# Patient Record
Sex: MALE | Race: WHITE | NOT HISPANIC OR LATINO | Employment: OTHER | URBAN - METROPOLITAN AREA
[De-identification: names, ages, dates, MRNs, and addresses within clinical notes are randomized per-mention and may not be internally consistent; named-entity substitution may affect disease eponyms.]

---

## 2017-01-06 DIAGNOSIS — J44.1 COPD EXACERBATION: ICD-10-CM

## 2017-01-06 RX ORDER — PREDNISONE 20 MG/1
TABLET ORAL
Qty: 50 TABLET | Refills: 1 | Status: SHIPPED | OUTPATIENT
Start: 2017-01-06 | End: 2017-05-25 | Stop reason: SDUPTHER

## 2017-05-05 DIAGNOSIS — G47.00 INSOMNIA, UNSPECIFIED TYPE: Primary | ICD-10-CM

## 2017-05-05 RX ORDER — TEMAZEPAM 30 MG/1
30 CAPSULE ORAL NIGHTLY PRN
Qty: 30 CAPSULE | Refills: 5 | Status: SHIPPED | OUTPATIENT
Start: 2017-05-05 | End: 2017-06-04

## 2017-05-05 RX ORDER — TEMAZEPAM 30 MG/1
CAPSULE ORAL
Qty: 30 CAPSULE | Refills: 5 | OUTPATIENT
Start: 2017-05-05

## 2017-05-25 ENCOUNTER — OFFICE VISIT (OUTPATIENT)
Dept: PULMONOLOGY | Facility: CLINIC | Age: 72
End: 2017-05-25
Payer: MEDICARE

## 2017-05-25 ENCOUNTER — PROCEDURE VISIT (OUTPATIENT)
Dept: PULMONOLOGY | Facility: CLINIC | Age: 72
End: 2017-05-25
Payer: MEDICARE

## 2017-05-25 VITALS
OXYGEN SATURATION: 93 % | WEIGHT: 202.81 LBS | HEART RATE: 62 BPM | HEIGHT: 73 IN | DIASTOLIC BLOOD PRESSURE: 64 MMHG | BODY MASS INDEX: 26.88 KG/M2 | SYSTOLIC BLOOD PRESSURE: 118 MMHG

## 2017-05-25 DIAGNOSIS — F17.200 SMOKING: Primary | ICD-10-CM

## 2017-05-25 DIAGNOSIS — J44.9 COPD (CHRONIC OBSTRUCTIVE PULMONARY DISEASE): Primary | ICD-10-CM

## 2017-05-25 DIAGNOSIS — J44.9 CHRONIC OBSTRUCTIVE PULMONARY DISEASE, UNSPECIFIED COPD TYPE: ICD-10-CM

## 2017-05-25 DIAGNOSIS — J44.1 COPD EXACERBATION: ICD-10-CM

## 2017-05-25 LAB
PRE FEV1 FVC: 52.05 % (ref 63.31–82.66)
PRE FEV1: 1.25 L (ref 2.68–4.39)
PRE FVC: 2.4 L (ref 3.82–5.84)
PRE PEF: 3.28 L/S (ref 6.22–11.26)

## 2017-05-25 PROCEDURE — 99999 PR PBB SHADOW E&M-EST. PATIENT-LVL IV: CPT | Mod: PBBFAC,,, | Performed by: INTERNAL MEDICINE

## 2017-05-25 PROCEDURE — 99215 OFFICE O/P EST HI 40 MIN: CPT | Mod: 25,S$PBB,, | Performed by: INTERNAL MEDICINE

## 2017-05-25 PROCEDURE — 94010 BREATHING CAPACITY TEST: CPT | Mod: 26,S$PBB,, | Performed by: INTERNAL MEDICINE

## 2017-05-25 PROCEDURE — 99214 OFFICE O/P EST MOD 30 MIN: CPT | Mod: PBBFAC,25,PO | Performed by: INTERNAL MEDICINE

## 2017-05-25 RX ORDER — CHOLECALCIFEROL (VITAMIN D3) 25 MCG
1000 TABLET ORAL DAILY
COMMUNITY

## 2017-05-25 RX ORDER — PREDNISONE 20 MG/1
TABLET ORAL
Qty: 50 TABLET | Refills: 1 | Status: SHIPPED | OUTPATIENT
Start: 2017-05-25 | End: 2017-12-12 | Stop reason: ALTCHOICE

## 2017-05-25 RX ORDER — ALBUTEROL SULFATE 90 UG/1
2 AEROSOL, METERED RESPIRATORY (INHALATION) EVERY 6 HOURS
Qty: 8.5 G | Refills: 11 | Status: SHIPPED | OUTPATIENT
Start: 2017-05-25

## 2017-05-25 RX ORDER — ROSUVASTATIN CALCIUM 40 MG/1
TABLET, COATED ORAL
Refills: 6 | COMMUNITY
Start: 2017-04-25

## 2017-05-25 RX ORDER — AZITHROMYCIN 250 MG/1
250 TABLET, FILM COATED ORAL DAILY
Qty: 6 TABLET | Refills: 1 | Status: SHIPPED | OUTPATIENT
Start: 2017-05-25 | End: 2017-05-30 | Stop reason: SDUPTHER

## 2017-05-25 RX ORDER — PREDNISONE 20 MG/1
TABLET ORAL
Qty: 20 TABLET | Refills: 1 | Status: SHIPPED | OUTPATIENT
Start: 2017-05-25 | End: 2017-07-14 | Stop reason: SDUPTHER

## 2017-05-25 NOTE — PROGRESS NOTES
Subjective:       Patient ID: Harpreet Reeves is a 71 y.o. male.    Chief Complaint:   He   presents for evaluation and treatment of pneumonia and exacerbation of chronic obstructive pulmonary disease  after being discharged from the [facility]  5  months ago. Since discharge symptoms have gradually improving course since that time. Patient denies feer or chills. Symptoms are aggravated by acivity . Symptoms improve with rest.  Respiratory: positive for cough, dyspnea on exertion and sputum; Cardiovascular: no chest pain or palpitations.  Patient currently is on oxygen at 2 L/min per nasal cannula..    COPD    COPD  He presents for evaluation and treatment of COPD. The patient is currently having symptoms / an exacerbation. Current symptoms include acute dyspnea, cough productive of gray sputum in small amounts and wheezing. Symptoms have been present since several days ago and have been gradually worsening. He denies chills and fever. Associated symptoms include poor exercise tolerance and shortness of breath.  This episode appears to have been triggered by no identifiable factor. Treatments tried for the current exacerbation: albuterol nebulizer. The patient has been having similar episodes for approximately 10 years. He uses 1 pillows at night. Patient currently is on oxygen at 2 L/min per nasal cannula. on night. The patient is having no constitutional symptoms, denying fever, chills, anorexia, or weight loss. The patient has been hospitalized for this condition before. He currently smokes 1 packs per day. The patient is experiencing exercise intolerance (difficulty walking 1 blocks on flat ground).      HPI  Past Medical History:   Diagnosis Date    Arthritis     Bronchitis chronic     Coronary artery disease     Hypertension     Lung disease      Past Surgical History:   Procedure Laterality Date    APPENDECTOMY      BACK SURGERY      CARDIAC CATHETERIZATION      CHOLECYSTECTOMY       CORONARY ARTERY BYPASS GRAFT      CYST REMOVAL      tailbone    HIP ARTHROPLASTY       Social History     Social History    Marital status:      Spouse name: N/A    Number of children: N/A    Years of education: N/A     Occupational History    Not on file.     Social History Main Topics    Smoking status: Current Every Day Smoker     Packs/day: 0.50     Years: 58.00     Types: Cigarettes    Smokeless tobacco: Never Used      Comment: quit then started back    Alcohol use 12.6 oz/week     21 Standard drinks or equivalent per week      Comment: whiskey    Drug use: No    Sexual activity: Not on file     Other Topics Concern    Not on file     Social History Narrative    No narrative on file     Review of Systems   Constitutional: Positive for fatigue. Negative for fever.   HENT: Positive for postnasal drip, rhinorrhea and congestion.    Eyes: Negative for redness and itching.   Respiratory: Positive for cough, sputum production, shortness of breath, dyspnea on extertion, use of rescue inhaler and Paroxysmal Nocturnal Dyspnea.    Cardiovascular: Negative for chest pain, palpitations and leg swelling.   Genitourinary: Negative for difficulty urinating and hematuria.   Endocrine: Negative for cold intolerance and heat intolerance.    Skin: Negative for rash.   Gastrointestinal: Negative for nausea and abdominal pain.   Neurological: Negative for dizziness, syncope, weakness and light-headedness.   Hematological: Negative for adenopathy. Does not bruise/bleed easily.   Psychiatric/Behavioral: Negative for sleep disturbance. The patient is not nervous/anxious.        Objective:      Physical Exam   Constitutional: He is oriented to person, place, and time. He appears well-developed and well-nourished.   HENT:   Head: Normocephalic and atraumatic.   Mouth/Throat: Oropharyngeal exudate present.   Eyes: Conjunctivae are normal. Pupils are equal, round, and reactive to light.   Neck: Neck supple. No JVD  present. No tracheal deviation present. No thyromegaly present.   Cardiovascular: Normal rate, regular rhythm and normal heart sounds.    No murmur heard.  Pulmonary/Chest: Effort normal. He has decreased breath sounds. He has wheezes in the right lower field and the left lower field. He has no rhonchi. He has no rales.   Abdominal: Soft. Bowel sounds are normal.   Musculoskeletal: Normal range of motion. He exhibits no edema or tenderness.   Lymphadenopathy:     He has no cervical adenopathy.   Neurological: He is alert and oriented to person, place, and time.   Skin: Skin is warm and dry.   Nursing note and vitals reviewed.    Personal Diagnostic Review  none pertinent  .[email or web address]  No flowsheet data found.      Assessment:       1. Smoking    2. Chronic obstructive pulmonary disease, unspecified COPD type        Outpatient Encounter Prescriptions as of 5/25/2017   Medication Sig Dispense Refill    albuterol (PROAIR HFA) 90 mcg/actuation inhaler Inhale 2 puffs into the lungs every 6 (six) hours. Rescue 8.5 g 11    albuterol-ipratropium 2.5mg-0.5mg/3mL (DUO-NEB) 0.5 mg-3 mg(2.5 mg base)/3 mL nebulizer solution Take 3 mLs by nebulization every 6 (six) hours while awake. 120 vial 12    aspirin (ECOTRIN) 81 MG EC tablet Take 81 mg by mouth every other day.       clopidogrel (PLAVIX) 75 mg tablet Take by mouth. 1 tablet Oral Every day      furosemide (LASIX) 40 MG tablet   0    ipratropium-albuterol (COMBIVENT RESPIMAT)  mcg/actuation inhaler Inhale 1 puff into the lungs 4 times daily. 4 g 11    mometasone-formoterol (DULERA) 200-5 mcg/actuation inhaler Inhale 2 puffs into the lungs 2 (two) times daily. Controller 13 g 11    nebivolol (BYSTOLIC) 2.5 MG Tab Take by mouth. 1 Tablet Oral Every day      potassium chloride SA (K-DUR,KLOR-CON) 20 MEQ tablet   0    RANEXA 1,000 mg Tb12   5    rosuvastatin (CRESTOR) 40 MG Tab   6    temazepam (RESTORIL) 30 mg capsule Take 1 capsule (30 mg total) by mouth  nightly as needed for Insomnia. 30 capsule 5    vitamin D 1000 units Tab Take 1,000 Units by mouth once daily.      [DISCONTINUED] COMBIVENT RESPIMAT  mcg/actuation inhaler Inhale 1 puff into the lungs 4 times daily. 4 g 11    [DISCONTINUED] DULERA 200-5 mcg/actuation inhaler USE 2 PUFFS BY MOUTH TWICE A DAY. RINSE MOUTH AFTER EACH USE. 13 g 11    [DISCONTINUED] PROAIR HFA 90 mcg/actuation inhaler USE 2 PUFFS BY MOUTH EVERY 6 HOURS 8.5 g 11    predniSONE (DELTASONE) 20 MG tablet Prednisone 60 mg/ day for 3 days, 40 mg/day for 3 days,20 mg/ day for 3 days, (1/2 tablet )10 mg a day for 3 days. 20 tablet 1    [DISCONTINUED] azithromycin (ZITHROMAX Z-LAISHA) 250 MG tablet Take 1 tablet (250 mg total) by mouth once daily. 500 mg on day 1 (two tablets) followed by 250 mg once daily on days 2-5 6 tablet 1    [DISCONTINUED] doxycycline (MONODOX) 100 MG capsule Take 1 capsule (100 mg total) by mouth every 12 (twelve) hours. 20 capsule 1    [DISCONTINUED] hydrochlorothiazide (MICROZIDE) 12.5 mg capsule Take by mouth. 1 Capsule Oral Every day      [DISCONTINUED] predniSONE (DELTASONE) 20 MG tablet TAKE 3 TABLETS DAILY FOR 7 DAYS, THEN TAKE 2 TABLETS DAILY FOR 7 DAYS, THEN TAKE 1 TABLET DAILY FOR 7 DAYS, THEN TAKE 1/2 TABLET DAILY FOR 7 50 tablet 1    [DISCONTINUED] rosuvastatin (CRESTOR) 10 MG tablet Take by mouth. Half Tablet Oral Every day       No facility-administered encounter medications on file as of 5/25/2017.      Orders Placed This Encounter   Procedures    Ambulatory referral to Smoking Cessation Program     Referral Priority:   Routine     Referral Type:   Consultation     Referral Reason:   Specialty Services Required     Requested Specialty:   CTTS     Number of Visits Requested:   1    Spirometry with/without bronchodilator     Standing Status:   Future     Standing Expiration Date:   5/25/2018    Stress test, pulmonary     Standing Status:   Future     Standing Expiration Date:   5/25/2018      Plan:       Requested Prescriptions     Signed Prescriptions Disp Refills    predniSONE (DELTASONE) 20 MG tablet 20 tablet 1     Sig: Prednisone 60 mg/ day for 3 days, 40 mg/day for 3 days,20 mg/ day for 3 days, (1/2 tablet )10 mg a day for 3 days.    mometasone-formoterol (DULERA) 200-5 mcg/actuation inhaler 13 g 11     Sig: Inhale 2 puffs into the lungs 2 (two) times daily. Controller    albuterol (PROAIR HFA) 90 mcg/actuation inhaler 8.5 g 11     Sig: Inhale 2 puffs into the lungs every 6 (six) hours. Rescue    ipratropium-albuterol (COMBIVENT RESPIMAT)  mcg/actuation inhaler 4 g 11     Sig: Inhale 1 puff into the lungs 4 times daily.     Smoking  -     Ambulatory referral to Smoking Cessation Program    Chronic obstructive pulmonary disease, unspecified COPD type  -     mometasone-formoterol (DULERA) 200-5 mcg/actuation inhaler; Inhale 2 puffs into the lungs 2 (two) times daily. Controller  Dispense: 13 g; Refill: 11  -     albuterol (PROAIR HFA) 90 mcg/actuation inhaler; Inhale 2 puffs into the lungs every 6 (six) hours. Rescue  Dispense: 8.5 g; Refill: 11  -     Ambulatory referral to Smoking Cessation Program  -     Spirometry with/without bronchodilator; Future; Expected date: 11/25/2017  -     Stress test, pulmonary; Future; Expected date: 05/25/2017  -     ipratropium-albuterol (COMBIVENT RESPIMAT)  mcg/actuation inhaler; Inhale 1 puff into the lungs 4 times daily.  Dispense: 4 g; Refill: 11    Other orders  -     predniSONE (DELTASONE) 20 MG tablet; Prednisone 60 mg/ day for 3 days, 40 mg/day for 3 days,20 mg/ day for 3 days, (1/2 tablet )10 mg a day for 3 days.  Dispense: 20 tablet; Refill: 1  -     Discontinue: azithromycin (ZITHROMAX Z-LAISHA) 250 MG tablet; Take 1 tablet (250 mg total) by mouth once daily. 500 mg on day 1 (two tablets) followed by 250 mg once daily on days 2-5  Dispense: 6 tablet; Refill: 1           Return in about 6 months (around 11/25/2017) for lexi and 6 min  walk.     MEDICAL DECISION MAKING: Moderate to high complexity.  Overall, the multiple problems listed are of moderate to high severity that may impact quality of life and activities of daily living. Side effects of medications, treatment plan as well as options and alternatives reviewed and discussed with patient. There was counseling of patient concerning these issues.    Total time spent in face to face counseling and coordination of care - 40 minutes over 50% of time was used in discussion of prognosis, risks, benefits of treatment, instructions and compliance with regimen . Discussion with other physicians or health care providers (homehealth, durable medical equipment providers).

## 2017-05-31 RX ORDER — AZITHROMYCIN 250 MG/1
TABLET, FILM COATED ORAL
Qty: 6 TABLET | Refills: 1 | Status: SHIPPED | OUTPATIENT
Start: 2017-05-31 | End: 2017-12-12 | Stop reason: ALTCHOICE

## 2017-07-14 RX ORDER — PREDNISONE 20 MG/1
TABLET ORAL
Qty: 20 TABLET | Refills: 1 | Status: SHIPPED | OUTPATIENT
Start: 2017-07-14 | End: 2017-09-06 | Stop reason: SDUPTHER

## 2017-09-06 RX ORDER — PREDNISONE 20 MG/1
TABLET ORAL
Qty: 20 TABLET | Refills: 1 | Status: SHIPPED | OUTPATIENT
Start: 2017-09-06 | End: 2017-09-29 | Stop reason: SDUPTHER

## 2017-10-01 RX ORDER — PREDNISONE 20 MG/1
TABLET ORAL
Qty: 20 TABLET | Refills: 1 | Status: SHIPPED | OUTPATIENT
Start: 2017-10-01 | End: 2017-12-04 | Stop reason: SDUPTHER

## 2017-11-20 DIAGNOSIS — J44.9 CHRONIC OBSTRUCTIVE PULMONARY DISEASE, UNSPECIFIED COPD TYPE: ICD-10-CM

## 2017-11-20 RX ORDER — MOMETASONE FUROATE AND FORMOTEROL FUMARATE DIHYDRATE 200; 5 UG/1; UG/1
AEROSOL RESPIRATORY (INHALATION)
Qty: 13 G | Refills: 11 | Status: SHIPPED | OUTPATIENT
Start: 2017-11-20

## 2017-11-20 RX ORDER — ALBUTEROL SULFATE 90 UG/1
AEROSOL, METERED RESPIRATORY (INHALATION)
Qty: 8.5 G | Refills: 11 | Status: SHIPPED | OUTPATIENT
Start: 2017-11-20

## 2017-12-05 RX ORDER — TEMAZEPAM 30 MG/1
CAPSULE ORAL
Qty: 30 CAPSULE | Refills: 5 | Status: SHIPPED | OUTPATIENT
Start: 2017-12-05 | End: 2017-12-06 | Stop reason: SDUPTHER

## 2017-12-05 RX ORDER — PREDNISONE 20 MG/1
TABLET ORAL
Qty: 20 TABLET | Refills: 1 | Status: SHIPPED | OUTPATIENT
Start: 2017-12-05 | End: 2017-12-12 | Stop reason: ALTCHOICE

## 2017-12-06 DIAGNOSIS — G47.00 INSOMNIA, UNSPECIFIED TYPE: Primary | ICD-10-CM

## 2017-12-06 RX ORDER — TEMAZEPAM 30 MG/1
30 CAPSULE ORAL NIGHTLY
Qty: 30 CAPSULE | Refills: 5 | Status: CANCELLED | OUTPATIENT
Start: 2017-12-06

## 2017-12-06 RX ORDER — TEMAZEPAM 30 MG/1
30 CAPSULE ORAL NIGHTLY
Qty: 30 CAPSULE | Refills: 5 | Status: SHIPPED | OUTPATIENT
Start: 2017-12-06

## 2017-12-06 NOTE — TELEPHONE ENCOUNTER
----- Message from Kely Eng sent at 12/6/2017  3:58 PM CST -----  Contact: patient  Calling concerning the status of his RX refills. Please call patient ASAP @ [phone]. Thanks, tori

## 2017-12-12 ENCOUNTER — PROCEDURE VISIT (OUTPATIENT)
Dept: PULMONOLOGY | Facility: CLINIC | Age: 72
End: 2017-12-12
Payer: MEDICARE

## 2017-12-12 ENCOUNTER — HOSPITAL ENCOUNTER (OUTPATIENT)
Dept: RADIOLOGY | Facility: HOSPITAL | Age: 72
Discharge: HOME | End: 2017-12-12
Attending: NURSE PRACTITIONER
Payer: MEDICARE

## 2017-12-12 ENCOUNTER — OFFICE VISIT (OUTPATIENT)
Dept: PULMONOLOGY | Facility: CLINIC | Age: 72
End: 2017-12-12
Payer: MEDICARE

## 2017-12-12 VITALS
SYSTOLIC BLOOD PRESSURE: 126 MMHG | DIASTOLIC BLOOD PRESSURE: 60 MMHG | WEIGHT: 202.38 LBS | RESPIRATION RATE: 22 BRPM | HEIGHT: 71 IN | OXYGEN SATURATION: 95 % | BODY MASS INDEX: 28.33 KG/M2 | HEART RATE: 58 BPM | BODY MASS INDEX: 28.28 KG/M2 | HEIGHT: 71 IN | WEIGHT: 202 LBS

## 2017-12-12 DIAGNOSIS — J18.9 PNEUMONIA OF LEFT LOWER LOBE DUE TO INFECTIOUS ORGANISM: ICD-10-CM

## 2017-12-12 DIAGNOSIS — J44.1 COPD WITH ACUTE EXACERBATION: Primary | ICD-10-CM

## 2017-12-12 DIAGNOSIS — J44.9 CHRONIC OBSTRUCTIVE PULMONARY DISEASE, UNSPECIFIED COPD TYPE: ICD-10-CM

## 2017-12-12 DIAGNOSIS — F17.200 TOBACCO USE DISORDER: ICD-10-CM

## 2017-12-12 DIAGNOSIS — J44.1 COPD WITH ACUTE EXACERBATION: ICD-10-CM

## 2017-12-12 PROCEDURE — 71020 XR CHEST PA AND LATERAL: CPT | Mod: 26,,, | Performed by: RADIOLOGY

## 2017-12-12 PROCEDURE — 94620 PR PULMONARY STRESS TESTING,SIMPLE: CPT | Mod: PBBFAC,PO

## 2017-12-12 PROCEDURE — 99214 OFFICE O/P EST MOD 30 MIN: CPT | Mod: 25,S$PBB,, | Performed by: NURSE PRACTITIONER

## 2017-12-12 PROCEDURE — 94010 BREATHING CAPACITY TEST: CPT | Mod: 26,S$PBB,, | Performed by: INTERNAL MEDICINE

## 2017-12-12 PROCEDURE — 99999 PR PBB SHADOW E&M-EST. PATIENT-LVL V: CPT | Mod: PBBFAC,,, | Performed by: NURSE PRACTITIONER

## 2017-12-12 PROCEDURE — 94010 BREATHING CAPACITY TEST: CPT | Mod: PBBFAC,PO

## 2017-12-12 PROCEDURE — 71020 XR CHEST PA AND LATERAL: CPT | Mod: TC,PO

## 2017-12-12 PROCEDURE — 94620 PR PULMONARY STRESS TESTING,SIMPLE: CPT | Mod: 26,S$PBB,, | Performed by: INTERNAL MEDICINE

## 2017-12-12 PROCEDURE — 99215 OFFICE O/P EST HI 40 MIN: CPT | Mod: PBBFAC,25,PO | Performed by: NURSE PRACTITIONER

## 2017-12-12 RX ORDER — GUAIFENESIN 600 MG/1
1200 TABLET, EXTENDED RELEASE ORAL 2 TIMES DAILY
COMMUNITY

## 2017-12-12 RX ORDER — PREDNISONE 20 MG/1
TABLET ORAL
Qty: 50 TABLET | Refills: 0 | Status: SHIPPED | OUTPATIENT
Start: 2017-12-12

## 2017-12-12 RX ORDER — AMOXICILLIN AND CLAVULANATE POTASSIUM 875; 125 MG/1; MG/1
1 TABLET, FILM COATED ORAL EVERY 12 HOURS
Qty: 28 TABLET | Refills: 1 | Status: SHIPPED | OUTPATIENT
Start: 2017-12-12 | End: 2017-12-26

## 2017-12-12 NOTE — ASSESSMENT & PLAN NOTE
Increase opacity of left lung with acute COPD flare, complete augmentin x 14 days follow up in 4 weeks repeat cxr, follow up sooner if not improved.

## 2017-12-12 NOTE — Clinical Note
Please advise of possible pneumonia left lung, complete augmentin, follow up repeat chest xray 4 weeks, follow up sooner if not improving.

## 2017-12-12 NOTE — PROGRESS NOTES
Chief Complaint   Patient presents with    COPD       Subjective:      HPI:  COPD  He presents for evaluation and treatment of COPD.  The patient is currently having symptoms / an exacerbation over the past 7 days.   Current symptoms include acute dyspnea, chronic dyspnea, cough productive of gray sputum in small amounts and wheezing.   Symptoms have been present since 7 days ago and have been unchanged.   He denies fever or chest pain. Associated symptoms include poor exercise tolerance, shortness of breath and wheezing.  This episode appears to have been triggered by no identifiable factor.   Treatments tried for the current exacerbation: albuterol inhaler, albuterol nebulizer, albuterol/ipratropium inhaler, ipratropium nebulizer, oxygen and dulera inhaler.   He uses 3 pillows at night.   Patient currently is on oxygen at 2 L/min per nasal cannula..  The patient is having no constitutional symptoms, denying fever, chills, anorexia, or weight loss.    He currently smokes 1 packs per day. The patient is experiencing exercise intolerance (difficulty walking 50  feet on flat ground).     Family/Medical/Surgical/Social History  is allergic to haldol  [haloperidol lactate] and morphine.  family history includes Cancer in his brother; Diabetes in his mother; Emphysema in his father; Heart failure in his brother and mother; Hypertension in his brother, father, and mother.  has a current medication list which includes the following prescription(s): albuterol, albuterol-ipratropium 2.5mg-0.5mg/3ml, aspirin, clopidogrel, dulera, furosemide, guaifenesin, ipratropium-albuterol, mometasone-formoterol, nebivolol, potassium chloride sa, proair hfa, ranexa, rosuvastatin, temazepam, vitamin d, amoxicillin-clavulanate 875-125mg, and prednisone.   has a past surgical history that includes Coronary artery bypass graft; Cardiac catheterization; Appendectomy; Cholecystectomy; Cyst Removal; Hip Arthroplasty; Back surgery; and Cardiac  "catheterization (10/2017).   reports that he has been smoking Cigarettes.  He has a 29.00 pack-year smoking history. He has never used smokeless tobacco. He reports that he drinks about 12.6 oz of alcohol per week . He reports that he does not use drugs.    Review of Systems  Review of Systems   Constitutional: Negative for fever, chills, weight loss, weight gain, activity change, appetite change, fatigue and night sweats.   HENT: Negative for postnasal drip, rhinorrhea, sinus pressure, voice change and congestion.    Eyes: Negative for redness and itching.   Respiratory: Negative for snoring, cough, sputum production, chest tightness, shortness of breath, wheezing, orthopnea, asthma nighttime symptoms, dyspnea on extertion, use of rescue inhaler and somnolence.    Cardiovascular: Negative.  Negative for chest pain, palpitations and leg swelling.   Genitourinary: Negative for difficulty urinating and hematuria.   Endocrine: Negative for cold intolerance and heat intolerance.    Musculoskeletal: Negative for arthralgias, gait problem, joint swelling and myalgias.   Skin: Negative.    Gastrointestinal: Negative for nausea, vomiting, abdominal pain and acid reflux.   Neurological: Negative for dizziness, weakness, light-headedness and headaches.   Hematological: Negative for adenopathy. No excessive bruising.   All other systems reviewed and are negative.    Objective:     /60   Pulse (!) 58   Resp (!) 22   Ht 5' 11" (1.803 m)   Wt 91.6 kg (202 lb)   SpO2 95% Comment: 2l/nc  BMI 28.17 kg/m²   Physical Exam   Constitutional: He is oriented to person, place, and time. Vital signs are normal. He appears well-developed and well-nourished. He is active and cooperative.  Non-toxic appearance. He does not appear ill. No distress.   HENT:   Head: Normocephalic and atraumatic.   Right Ear: External ear normal.   Left Ear: External ear normal.   Nose: Nose normal.   Mouth/Throat: Oropharynx is clear and moist. No " oropharyngeal exudate.   Eyes: Conjunctivae are normal.   Neck: Normal range of motion. Neck supple.   Cardiovascular: Normal rate, regular rhythm, normal heart sounds and intact distal pulses.    Pulmonary/Chest: Effort normal and breath sounds normal.   Abdominal: Soft.   Musculoskeletal: He exhibits no edema.   Neurological: He is alert and oriented to person, place, and time. He has normal reflexes.   Skin: Skin is warm and dry.   Psychiatric: He has a normal mood and affect. His behavior is normal. Judgment and thought content normal.   Vitals reviewed.    Assessment:     1. COPD with acute exacerbation    2. Chronic obstructive pulmonary disease, unspecified COPD type    3. Pneumonia of left lower lobe due to infectious organism    4. Tobacco use disorder        Orders Placed This Encounter   Procedures    X-Ray Chest PA And Lateral     Standing Status:   Future     Number of Occurrences:   1     Standing Expiration Date:   12/12/2018     Order Specific Question:   May the Radiologist modify the order per protocol to meet the clinical needs of the patient?     Answer:   Yes    X-Ray Chest PA And Lateral     Standing Status:   Future     Standing Expiration Date:   12/12/2018     Order Specific Question:   May the Radiologist modify the order per protocol to meet the clinical needs of the patient?     Answer:   Yes    Spirometry with/without bronchodilator     Standing Status:   Future     Standing Expiration Date:   12/12/2018     Diagnostic Testing Reviewed  All relevant imaging and labs reviewed.  IMAGING:   Chest xray 12/12/2017  Findings: The cardiomediastinal silhouette remains slightly enlarged. Post surgical changes of a prior CABG are noted.   There is disruption of the sternotomy wire.   There is persistent opacification of the left lower lobe with elevation of left hemidiaphragm.   However, the opacification appears increased in the left midlung zone with mixed interstitial and air space  opacities.   Right lung appears clear. Old right-sided rib fractures are seen bones appear to be intact.  IMPRESSION: New mixed interstitial and air space opacities in the left midlung zone.   Rule out pneumonia. Persistent left basilar pleural-parenchymal changes.     Plan:     Problem List Items Addressed This Visit     COPD (chronic obstructive pulmonary disease)     With acute flare complete augmentin and prednisone          Relevant Medications    predniSONE (DELTASONE) 20 MG tablet    Other Relevant Orders    Spirometry with/without bronchodilator    Pneumonia of left lower lobe due to infectious organism     Increase opacity of left lung with acute COPD flare, complete augmentin x 14 days follow up in 4 weeks repeat cxr, follow up sooner if not improved.          Relevant Orders    X-Ray Chest PA And Lateral    Tobacco use disorder     Current every day smoker 1 pk/day. Not interested in quitting, strong advice to quit  8-10 min smoking cessation [place]            Other Visit Diagnoses     COPD with acute exacerbation    -  Primary    Relevant Medications    predniSONE (DELTASONE) 20 MG tablet    amoxicillin-clavulanate 875-125mg (AUGMENTIN) 875-125 mg per tablet    Other Relevant Orders    X-Ray Chest PA And Lateral (Completed)        Return in about 4 weeks (around 1/9/2018) for Left pneumonia fu with review cxr. fu 6 mos COPD follow up w/review lexi.

## 2017-12-12 NOTE — ASSESSMENT & PLAN NOTE
Current every day smoker 1 pk/day. Not interested in quitting, strong advice to quit  8-10 min smoking cessation [place]

## 2017-12-12 NOTE — PROCEDURES
"[facility]  Six Minute Walk     SUMMARY     Ordering Provider: Elizabeth Lejeune   Interpreting Provider: Dr Ness  Performing nurse/tech/RT: JACQUI Weathers  Diagnosis: COPD  Height: 5' 11" (180.3 cm)  Weight: 91.8 kg (202 lb 6.4 oz)  BMI (Calculated): 28.3   Patient Race:             Phase Oxygen Assessment Supplemental O2 Heart   Rate Blood Pressure Rodney Dyspnea Scale Rating   Resting 92 % Room Air 56 bpm 145/58 7-8   Exercise        Minute        1 88 % Room Air 56 bpm     2 91 % 2 L/M 60 bpm     3           4           5           6  91 % 2 L/M 56 bpm 146/58 9   Recovery        Minute        1 91 % 2 L/M 61 bpm     2 91 % 2 L/M 60 bpm     3 94 % 2 L/M 60 bpm     4 93 % 2 L/M 58 bpm 157/55 7-8     Six Minute Walk Summary  6MWT Status: not completed  Patient Reported: Dyspnea     Interpretation:  Did the patient stop or pause?: Yes  How many times did the patient stop or pause?: 1  Stop Time 1: 83  Restart Time 1: 117  Pause Time 1: 34 seconds  Stop Time 2: 117     Pause Time 2: 203 seconds        Total Time Walked (Calculated): 123 seconds  Final Partial Lap Distance (feet): 0 feet  Total Distance Meters (Calculated): 60.96 meters  Predicted Distance Meters (Calculated): 522.81 meters  Percentage of Predicted (Calculated): 11.66  Peak VO2 (Calculated): 5.81  Mets: 1.66  Has The Patient Had a Previous Six Minute Walk Test?: Yes       Previous 6MWT Results  Has The Patient Had a Previous Six Minute Walk Test?: Yes  Date of Previous Test: 04/27/16  Total Time Walked: 360 seconds  Total Distance (meters): 182.88  Predicted Distance (meters): 568.58 meters  Percentage of Predicted: 32.16  Percent Change (Calculated): 0.67      REPORT  Walk protocol was completed on the walking 123 seconds out of 360 seconds.  Saturation doris was 88%.  Supplementary oxygen was added after the first minute.  2 L/m.  Dyspnea was severe.  Blood pressure and heart rate remained stable.  Distance completed was " significantly reduced compared to prior study 4/27/2016  Distance completed was 60.96 m (11.66% predicted)  Exercise capacity is severely impaired  Supplementary oxygen indicated with activity    Pee Ness MD

## 2017-12-12 NOTE — LETTER
December 12, 2017      Oj Talbert MD  [address] [facility]  [place] [address]           [facility]  [address] [place] [address]  Phone: [phone]  Fax: [phone]          Patient: Harpreet Reeves   MR Number: [id number]   YOB: 1945   Date of Visit: 12/12/2017       Dear Dr. Oj Talbert:    Thank you for referring Harpreet Reeves to me for evaluation. Attached you will find relevant portions of my assessment and plan of care.    If you have questions, please do not hesitate to call me. I look forward to following Harpreet Reeves along with you.    Sincerely,    Kerry Fam, NP    Enclosure  CC:  No Recipients    If you would like to receive this communication electronically, please contact [email or web address] or [phone] to request more information on [employer] Link access.    For providers and/or their staff who would like to refer a patient to [facility], please contact us through our one-stop-shop provider referral line, [facility] , at [phone].    If you feel you have received this communication in error or would no longer like to receive these types of communications, please e-mail [email or web address]

## 2017-12-15 ENCOUNTER — TELEPHONE (OUTPATIENT)
Dept: PULMONOLOGY | Facility: CLINIC | Age: 72
End: 2017-12-15

## 2017-12-18 ENCOUNTER — TELEPHONE (OUTPATIENT)
Dept: PULMONOLOGY | Facility: CLINIC | Age: 72
End: 2017-12-18

## 2017-12-18 NOTE — TELEPHONE ENCOUNTER
Patient wife notified of Xray result and complete oral antibiotic therapy, patient stated understanding

## 2017-12-20 RX ORDER — PREDNISONE 20 MG/1
TABLET ORAL
Qty: 20 TABLET | Refills: 1 | OUTPATIENT
Start: 2017-12-20